# Patient Record
Sex: FEMALE | URBAN - METROPOLITAN AREA
[De-identification: names, ages, dates, MRNs, and addresses within clinical notes are randomized per-mention and may not be internally consistent; named-entity substitution may affect disease eponyms.]

---

## 2019-08-12 ENCOUNTER — OFFICE VISIT (OUTPATIENT)
Dept: OBGYN CLINIC | Age: 34
End: 2019-08-12

## 2019-08-12 VITALS — DIASTOLIC BLOOD PRESSURE: 81 MMHG | SYSTOLIC BLOOD PRESSURE: 117 MMHG | WEIGHT: 128.4 LBS

## 2019-08-12 DIAGNOSIS — R39.15 URINARY URGENCY: Primary | ICD-10-CM

## 2019-08-12 LAB
BILIRUB UR QL STRIP: NEGATIVE
GLUCOSE UR-MCNC: NEGATIVE MG/DL
KETONES P FAST UR STRIP-MCNC: NEGATIVE MG/DL
PH UR STRIP: 8 [PH] (ref 4.6–8)
PROT UR QL STRIP: NEGATIVE
SP GR UR STRIP: 1 (ref 1–1.03)
UA UROBILINOGEN AMB POC: NORMAL (ref 0.2–1)
URINALYSIS CLARITY POC: NORMAL
URINALYSIS COLOR POC: NORMAL
URINE BLOOD POC: NEGATIVE
URINE LEUKOCYTES POC: NORMAL
URINE NITRITES POC: POSITIVE

## 2019-08-12 RX ORDER — NITROFURANTOIN MACROCRYSTALS 50 MG/1
100 CAPSULE ORAL 2 TIMES DAILY
Qty: 20 CAP | Refills: 0 | Status: SHIPPED | OUTPATIENT
Start: 2019-08-12 | End: 2019-08-17

## 2019-08-12 NOTE — PROGRESS NOTES
UTI note    Leilani Mayer is a G2 [phone],  37 y.o. female [facility] who presents today with concerns including Urinary Frequency and urgency and cloudy urine. Her symptoms started 6 days ago, stable since that time. States that yesterday she started to feel achy feverish with goose bumps, but drank some pomegranate juice and then felt better. Discomfort is in introits of the urethra area. Symptoms are not alleviated by hydration. Patient's other complaints: Felt a little feverish with goosebumps yesterday. Her symptoms are moderate. Patient denies back pain, congestion, cough, fever, headache, rhinitis, sorethroat, stomach ache and vaginal discharge. There is not any concern of sexual abuse. There is not a history of trauma to the genital area. Patient does not have a history of recurrent UTI. She does not have a history of pyelonephritis. She has a history of  has a past medical history of Abnormal Papanicolaou smear of cervix, H/O LEEP (2004), and HPV in female (2004).  She also has no past medical history of Anemia, Arrhythmia, Arthritis, Asthma, Autoimmune disease ([address] CAD (coronary artery disease), Calculus of kidney, Cancer ([address] Chronic kidney disease, Chronic obstructive pulmonary disease ([address] Chronic pain, Congestive heart failure ([address] Depression, Diabetes ([address] DVT (deep venous thrombosis) ([address] Endometriosis, Fibrocystic breast, Fibroid, uterine, GERD (gastroesophageal reflux disease), Headache, Hypercholesterolemia, Hypertension, Infertility, female, Liver disease, Oligomenorrhea, Ovarian cyst, PCOS (polycystic ovarian syndrome), PID (pelvic inflammatory disease), Preeclampsia, Psychotic disorder ([address] PUD (peptic ulcer disease), Seizures ([address] Stroke ([address] Thyroid disease, Trauma, Uterine anomaly, UTI (urinary tract infection), or Vaginitis, atrophic. with the following surgical history  has a past surgical history that includes hx leep procedure (2004) and hx wisdom teeth extraction (). .  . She has not been evaluated for her symptoms elsewhere. Last urinalysis results are: n/a    Urine dipstick shows negative for red blood cells, bacteria, glucose, protein, ketones, urobilinogen, positive for nitrites, leukocytes. Past Medical History:   Diagnosis Date    Abnormal Papanicolaou smear of cervix     H/O LEEP     HPV in female     none after leep procedure     Past Surgical History:   Procedure Laterality Date    HX LEEP PROCEDURE      HX WISDOM TEETH EXTRACTION       Social History     Occupational History    Not on file   Tobacco Use    Smoking status: Former Smoker     Last attempt to quit:      Years since quittin.6    Smokeless tobacco: Never Used   Substance and Sexual Activity    Alcohol use: Yes     Comment: socially    Drug use: Never    Sexual activity: Yes     Partners: Male     Birth control/protection: IUD     Family History   Problem Relation Age of Onset    Lung Cancer Father     Heart Disease Father     Alzheimer Maternal Grandmother     Heart Attack Maternal Grandfather     Heart Attack Paternal Grandfather        No Known Allergies  Prior to Admission medications    Medication Sig Start Date End Date Taking? Authorizing Provider   nitrofurantoin (MACRODANTIN) 50 mg capsule Take 2 Caps by mouth two (2) times a day for 5 days. 19 Yes Meg Jarrell CNM        Review of Systems: History obtained from the patient  Constitutional: negative for weight loss, fever, night sweats  Breast: negative for breast lumps, nipple discharge, galactorrhea  GI: negative for change in bowel habits, abdominal pain, black or bloody stools  : urinary frequency / urgency - noticed cloudy urine.   MSK: negative for back pain, joint pain, muscle pain  Skin: negative for itching, rash, hives  Psych: negative for anxiety, depression, change in mood      Objective:  Visit Vitals  /81   Wt 128 lb 6.4 oz (58.2 kg)       Physical Exam:   PHYSICAL EXAMINATION    Constitutional  · Appearance: well-nourished, well developed, alert, in no acute distress    Gastrointestinal  · Abdominal Examination: abdomen non-tender to palpation, normal bowel sounds, no masses present  · Liver and spleen: no hepatomegaly present, spleen not palpable  · Hernias: no hernias identified    Genitourinary  · deferred    Skin  · General Inspection: no rash, no lesions identified    Neurologic/Psychiatric  · Mental Status:  · Orientation: grossly oriented to person, place and time  · Mood and Affect: mood normal, affect appropriate    Assessment:   UTI    Plan:   Orders Placed This Encounter    CULTURE, URINE    AMB POC URINALYSIS DIP STICK MANUAL W/O MICRO    nitrofurantoin (MACRODANTIN) 50 mg capsule      F/up if symptoms persist or get worse.     Annual exam due 11/2019

## 2019-08-16 LAB — BACTERIA UR CULT: ABNORMAL

## 2023-11-08 ENCOUNTER — HOSPITAL ENCOUNTER (EMERGENCY)
Facility: HOSPITAL | Age: 38
Discharge: HOME | End: 2023-11-08
Attending: EMERGENCY MEDICINE
Payer: COMMERCIAL

## 2023-11-08 VITALS
OXYGEN SATURATION: 100 % | WEIGHT: 141.98 LBS | RESPIRATION RATE: 16 BRPM | DIASTOLIC BLOOD PRESSURE: 61 MMHG | HEART RATE: 63 BPM | BODY MASS INDEX: 26.13 KG/M2 | SYSTOLIC BLOOD PRESSURE: 100 MMHG | TEMPERATURE: 97.8 F | HEIGHT: 62 IN

## 2023-11-08 DIAGNOSIS — R55 SYNCOPE AND COLLAPSE: Primary | ICD-10-CM

## 2023-11-08 LAB
ALBUMIN SERPL-MCNC: 3.8 G/DL (ref 3.5–5)
ALBUMIN/GLOB SERPL: 1.1 (ref 1.1–2.2)
ALP SERPL-CCNC: 34 U/L (ref 45–117)
ALT SERPL-CCNC: 24 U/L (ref 12–78)
ANION GAP SERPL CALC-SCNC: 3 MMOL/L (ref 5–15)
APPEARANCE UR: CLEAR
AST SERPL-CCNC: 17 U/L (ref 15–37)
BACTERIA URNS QL MICRO: NEGATIVE /HPF
BASOPHILS # BLD: 0 K/UL (ref 0–0.1)
BASOPHILS NFR BLD: 1 % (ref 0–1)
BILIRUB SERPL-MCNC: 0.7 MG/DL (ref 0.2–1)
BILIRUB UR QL: NEGATIVE
BUN SERPL-MCNC: 17 MG/DL (ref 6–20)
BUN/CREAT SERPL: 22 (ref 12–20)
CALCIUM SERPL-MCNC: 8.7 MG/DL (ref 8.5–10.1)
CHLORIDE SERPL-SCNC: 108 MMOL/L (ref 97–108)
CO2 SERPL-SCNC: 27 MMOL/L (ref 21–32)
COLOR UR: ABNORMAL
CREAT SERPL-MCNC: 0.79 MG/DL (ref 0.55–1.02)
DIFFERENTIAL METHOD BLD: ABNORMAL
EKG ATRIAL RATE: 62 BPM
EKG DIAGNOSIS: NORMAL
EKG P AXIS: 61 DEGREES
EKG P-R INTERVAL: 150 MS
EKG Q-T INTERVAL: 426 MS
EKG QRS DURATION: 86 MS
EKG QTC CALCULATION (BAZETT): 432 MS
EKG R AXIS: 66 DEGREES
EKG T AXIS: 56 DEGREES
EKG VENTRICULAR RATE: 62 BPM
EOSINOPHIL # BLD: 0.2 K/UL (ref 0–0.4)
EOSINOPHIL NFR BLD: 3 % (ref 0–7)
EPITH CASTS URNS QL MICRO: ABNORMAL /LPF
ERYTHROCYTE [DISTWIDTH] IN BLOOD BY AUTOMATED COUNT: 12.3 % (ref 11.5–14.5)
GLOBULIN SER CALC-MCNC: 3.4 G/DL (ref 2–4)
GLUCOSE SERPL-MCNC: 87 MG/DL (ref 65–100)
GLUCOSE UR STRIP.AUTO-MCNC: NEGATIVE MG/DL
HCG UR QL: NEGATIVE
HCT VFR BLD AUTO: 37 % (ref 35–47)
HGB BLD-MCNC: 12.1 G/DL (ref 11.5–16)
HGB UR QL STRIP: NEGATIVE
HYALINE CASTS URNS QL MICRO: ABNORMAL /LPF (ref 0–2)
IMM GRANULOCYTES # BLD AUTO: 0 K/UL (ref 0–0.04)
IMM GRANULOCYTES NFR BLD AUTO: 1 % (ref 0–0.5)
KETONES UR QL STRIP.AUTO: ABNORMAL MG/DL
LEUKOCYTE ESTERASE UR QL STRIP.AUTO: NEGATIVE
LYMPHOCYTES # BLD: 1.2 K/UL (ref 0.8–3.5)
LYMPHOCYTES NFR BLD: 25 % (ref 12–49)
MAGNESIUM SERPL-MCNC: 2.1 MG/DL (ref 1.6–2.4)
MCH RBC QN AUTO: 30.7 PG (ref 26–34)
MCHC RBC AUTO-ENTMCNC: 32.7 G/DL (ref 30–36.5)
MCV RBC AUTO: 93.9 FL (ref 80–99)
MONOCYTES # BLD: 0.3 K/UL (ref 0–1)
MONOCYTES NFR BLD: 6 % (ref 5–13)
NEUTS SEG # BLD: 3.3 K/UL (ref 1.8–8)
NEUTS SEG NFR BLD: 64 % (ref 32–75)
NITRITE UR QL STRIP.AUTO: NEGATIVE
NRBC # BLD: 0 K/UL (ref 0–0.01)
NRBC BLD-RTO: 0 PER 100 WBC
PH UR STRIP: 7 (ref 5–8)
PLATELET # BLD AUTO: 226 K/UL (ref 150–400)
PMV BLD AUTO: 8.5 FL (ref 8.9–12.9)
POTASSIUM SERPL-SCNC: 4.2 MMOL/L (ref 3.5–5.1)
PROT SERPL-MCNC: 7.2 G/DL (ref 6.4–8.2)
PROT UR STRIP-MCNC: NEGATIVE MG/DL
RBC # BLD AUTO: 3.94 M/UL (ref 3.8–5.2)
RBC #/AREA URNS HPF: ABNORMAL /HPF (ref 0–5)
SODIUM SERPL-SCNC: 138 MMOL/L (ref 136–145)
SP GR UR REFRACTOMETRY: 1.02 (ref 1–1.03)
TROPONIN I SERPL HS-MCNC: 4 NG/L (ref 0–51)
URINE CULTURE IF INDICATED: ABNORMAL
UROBILINOGEN UR QL STRIP.AUTO: 0.2 EU/DL (ref 0.2–1)
WBC # BLD AUTO: 5.1 K/UL (ref 3.6–11)
WBC URNS QL MICRO: ABNORMAL /HPF (ref 0–4)

## 2023-11-08 PROCEDURE — 93005 ELECTROCARDIOGRAM TRACING: CPT | Performed by: NURSE PRACTITIONER

## 2023-11-08 PROCEDURE — 81025 URINE PREGNANCY TEST: CPT

## 2023-11-08 PROCEDURE — 81001 URINALYSIS AUTO W/SCOPE: CPT

## 2023-11-08 PROCEDURE — 2580000003 HC RX 258: Performed by: EMERGENCY MEDICINE

## 2023-11-08 PROCEDURE — 99284 EMERGENCY DEPT VISIT MOD MDM: CPT

## 2023-11-08 PROCEDURE — 80053 COMPREHEN METABOLIC PANEL: CPT

## 2023-11-08 PROCEDURE — 85025 COMPLETE CBC W/AUTO DIFF WBC: CPT

## 2023-11-08 PROCEDURE — 83735 ASSAY OF MAGNESIUM: CPT

## 2023-11-08 PROCEDURE — 36415 COLL VENOUS BLD VENIPUNCTURE: CPT

## 2023-11-08 PROCEDURE — 93010 ELECTROCARDIOGRAM REPORT: CPT | Performed by: SPECIALIST

## 2023-11-08 PROCEDURE — 84484 ASSAY OF TROPONIN QUANT: CPT

## 2023-11-08 RX ORDER — SODIUM CHLORIDE, SODIUM LACTATE, POTASSIUM CHLORIDE, AND CALCIUM CHLORIDE .6; .31; .03; .02 G/100ML; G/100ML; G/100ML; G/100ML
1000 INJECTION, SOLUTION INTRAVENOUS ONCE
Status: COMPLETED | OUTPATIENT
Start: 2023-11-08 | End: 2023-11-08

## 2023-11-08 RX ADMIN — SODIUM CHLORIDE, POTASSIUM CHLORIDE, SODIUM LACTATE AND CALCIUM CHLORIDE 1000 ML: 600; 310; 30; 20 INJECTION, SOLUTION INTRAVENOUS at 09:16

## 2023-11-08 ASSESSMENT — PAIN - FUNCTIONAL ASSESSMENT: PAIN_FUNCTIONAL_ASSESSMENT: NONE - DENIES PAIN

## 2023-11-08 NOTE — ED TRIAGE NOTES
Patient arrived ambulatory via POV with c/c \"seizure. \" Patient reports he was in the shower with boyfriend and suddenly started spitting, felt lightheaded and had syncopal episode with convulsion. Patient reports she has had a seizure before. Patient is not on meds and does not see neurologist. Patient denies hitting head, boyfriend helped lower her to ground. Patient denies incontinence.  Does not appear post ictal in triage

## 2023-11-08 NOTE — DISCHARGE INSTRUCTIONS
Routine appointments for health maintenance with a primary care provider are very important and emergency department visits are no substitute. You should review all findings and test results from your visit today with your primary care physician. We recommended that you take medications as prescribed. Drink lots of fluids. Do not drive or operate heavy machinery until you are follow-up with your PCP or cardiologist.    Return to the emergency department for any new or concerning signs/symptoms or failure to improve such as development of new symptoms, repeat episodes, numbness/ting/weakness to the extremities.

## 2023-11-08 NOTE — ED PROVIDER NOTES
does not see a Neurologist and does not take medication. States that her boyfriend caught her, denies hitting head, denies loss of control of bowel or bladder, states that her boyfriend said she was \"out\" for 1 minute and started convulsing. RAKAN Paris - NP  8:41 AM   [AF]   [id number] EKG interpreted by myself at 0855 shows normal sinus rhythm at rate 62 bpm, no acute ST segment elevations noted, normal intervals, no ectopy. [JR]   1028 CBC returned within acceptable limits. [JR]   0926 Chemistry returned within acceptable limits, ascites and magnesium are within acceptable limits. [JR]   1029 Troponin is within acceptable limits. [JR]   1109 Urinalysis is contaminated. Urine pregnancy test is negative. [JR]      ED Course User Index  [AF] Evelyn Melissa, RAKAN - NP  [JR] Olivia Kruse DO         ED physician interpretation of EKG: No STEMI. See my interpretation of EKG in ED course above.     Laboratory Results:  Labs Reviewed   CBC WITH AUTO DIFFERENTIAL - Abnormal; Notable for the following components:       Result Value    MPV 8.5 (*)     Immature Granulocytes 1 (*)     All other components within normal limits   COMPREHENSIVE METABOLIC PANEL - Abnormal; Notable for the following components:    Anion Gap 3 (*)     Bun/Cre Ratio 22 (*)     Alk Phosphatase 34 (*)     All other components within normal limits   URINALYSIS WITH REFLEX TO CULTURE - Abnormal; Notable for the following components:    Ketones, Urine TRACE (*)     Epithelial Cells UA MODERATE (*)     All other components within normal limits   MAGNESIUM   TROPONIN   PREGNANCY, URINE     ED physician interpretation of laboratory results: Documented in ED course    Imaging Results:  No orders to display     ED physician interpretation of imaging: Documented in ED course    Medications Given:  Medications   lactated ringers bolus bolus 1,000 mL (0 mLs IntraVENous Stopped 11/8/23 1142)       Differential Diagnosis included but not limited to: Syncope including but not limited to vasovagal syncope, arrhythmia, dehydration, and blood loss. Medical Decision Making  The patient was placed into an examination in room. Nursing notes were reviewed. The patient was interviewed and an examination was completed by me with the above findings. Patient was placed on a cardiac monitor secondary to an indication of syncope. MDM:    This is a 68-year-old female presents to the ED via POV complaint of syncope. Pain states she was in the shower with her boyfriend, when she felt like she was going to pass out with her vision darkening. Patient boyfriend did catch her, and slid down to the ground. He did characterize generalized convulsion like symptoms, lasting about 1 minute in duration. Patient not hit her head. Patient has had 1 seizure previously 4 years ago. She states it did also occur similarly to today's episode while she was in the shower. Patient denies having chest pain, shortness breath, palpitations prior to or after this event occurred. Patient states she did take hydroxyzine yesterday evening, and did drink a couple glasses of wine yesterday evening. Patient denies any drug use or smoking history. She denies any other medication use. She is not on any anticoagulants. She is not on any antiepileptic medications. Vital signs are stable, patient has no focal neurodeficits on physical exam.    EKG shows normal sinus rhythm without any acute ST segment changes, no ectopy is noted. Laboratory returned reassuring. Patient not had any other symptoms while being in the emergency department. Discharge were discussed with the patient at bedside. Upon reevaluation patient states she does feel improved and back to normal.  Patient likely did have a syncopal event. These findings were discussed with the patient and family at bedside.   She is advised to not drive or operate any heavy machinery until she is evaluated by her PCP